# Patient Record
Sex: FEMALE | Race: WHITE | Employment: OTHER | URBAN - METROPOLITAN AREA
[De-identification: names, ages, dates, MRNs, and addresses within clinical notes are randomized per-mention and may not be internally consistent; named-entity substitution may affect disease eponyms.]

---

## 2017-01-31 ENCOUNTER — TELEPHONE (OUTPATIENT)
Dept: FAMILY MEDICINE CLINIC | Facility: CLINIC | Age: 67
End: 2017-01-31

## 2017-01-31 NOTE — TELEPHONE ENCOUNTER
[facility] patient scheduled:     Future Appointments  Date Time Provider Shanelle Gallo   2/17/2017 10:15 AM MERLYN Rodríguez [facility]   3/31/2017 9:40 AM GREGORY Aaron CARD DG        Chart has been reviewed by nurse.

## 2017-02-17 ENCOUNTER — OFFICE VISIT (OUTPATIENT)
Dept: INTERNAL MEDICINE CLINIC | Facility: CLINIC | Age: 67
End: 2017-02-17

## 2017-02-17 VITALS
DIASTOLIC BLOOD PRESSURE: 78 MMHG | WEIGHT: 183 LBS | RESPIRATION RATE: 16 BRPM | SYSTOLIC BLOOD PRESSURE: 138 MMHG | BODY MASS INDEX: 32.02 KG/M2 | HEIGHT: 63.5 IN | HEART RATE: 64 BPM

## 2017-02-17 DIAGNOSIS — E66.9 OBESITY (BMI 30.0-34.9): Primary | ICD-10-CM

## 2017-02-17 DIAGNOSIS — Z51.81 ENCOUNTER FOR THERAPEUTIC DRUG MONITORING: ICD-10-CM

## 2017-02-17 DIAGNOSIS — Z98.84 S/P GASTRIC BYPASS: ICD-10-CM

## 2017-02-17 PROCEDURE — 99203 OFFICE O/P NEW LOW 30 MIN: CPT | Performed by: NURSE PRACTITIONER

## 2017-02-17 RX ORDER — LACTOBACIL 2/BIFIDO 1/S.THERMO 450B CELL
1 PACKET (EA) ORAL DAILY
Qty: 60 CAPSULE | Refills: 2 | Status: SHIPPED | COMMUNITY
Start: 2017-02-17 | End: 2017-02-17

## 2017-02-17 RX ORDER — TOPIRAMATE 25 MG/1
25 TABLET ORAL 2 TIMES DAILY
Qty: 60 TABLET | Refills: 1 | Status: SHIPPED | OUTPATIENT
Start: 2017-02-17 | End: 2017-03-24

## 2017-02-17 RX ORDER — LACTOBACIL 2/BIFIDO 1/S.THERMO 450B CELL
1 PACKET (EA) ORAL
Qty: 1 EACH | Refills: 1 | Status: SHIPPED | OUTPATIENT
Start: 2017-02-17 | End: 2017-03-13

## 2017-02-17 NOTE — PROGRESS NOTES
CC: Patient presents with:  Weight Check: bariatric surgery 13 years ago lost 120 pounds gained 25 back. unknown loss meds. revision last summer. no exercise       HPI:    Obesity.  Weight had been up and down prior ro gastri bypass with weight loss medicat differently: as needed.  QD to AA x 2-3 weeks ) Disp: 60 g Rfl: 0   CARAFATE 1 GM/10ML Oral Suspension TAKE 2 TEASPOONSFUL BY MOUTH BEFORE MEALS AND AT BEDTIME (Patient taking differently: TAKE 2 TEASPOONSFUL BY MOUTH BEFORE MEALS AND AT BEDTIME, as needed) Comment ovaries intact, hys due to bleeding cyst/no malignancy    OTHER SURGICAL HISTORY  2003    Comment gastric bypass     EXCISE BREAST LESION  2/26/2013    Comment Procedure: BIOPSY / LUMPECTOMY BREAST;  Surgeon: Hermann Montano MD;  Location: [facility] are clear  NECK: supple,no adenopathy,no bruits  LUNGS: clear to auscultation bilaterally   CARDIO: RRR, no murmur  GI: +BS's, NT/ND no masses, no HSM   MUSCULOSKELETAL: back is not tender,FROM of the back  EXTREMITIES: no cyanosis, no clubbing, no edema start monitoring diet intake by using [employer] as has scheduled appointment with dietician. Discussed follow-up, answered patient's questions and addressed concerns. Return in about 4 weeks (around 3/17/2017).

## 2017-02-17 NOTE — PATIENT INSTRUCTIONS
Please try to work on the following dietary changes this first month  1. Drink lots of water and cut down on soda consumption if soda drinker  2.  Eat breakfast daily and focus on protein such as greek yogurt with fruit, cottage cheese, string cheese, hard · Fiber is found in foods such as vegetables, fruits, beans, and whole grains. Unlike other carbs, fiber isn’t digested or absorbed. So it doesn’t raise blood sugar.  In fact, fiber can help keep blood sugar from rising too fast. It also helps keep blood ch Protein helps the body build and repair muscle and other tissue. Protein has little or no effect on blood sugar. However, many foods that contain protein also contain saturated fat.  By choosing low-fat protein sources, you can get the benefits of protein w Habits don’t change overnight. Setting your goals too high can leave you feeling discouraged if you can’t reach them. Be realistic. Choose one or two small changes you can make now. Set an action plan for how you are going to make these changes.  When you c - kidney beans   - lentils   - brown rice   - barley   - amaranth   - quinoa   - whole grain bread   - whole grain pasta

## 2017-02-20 PROCEDURE — 82397 CHEMILUMINESCENT ASSAY: CPT | Performed by: NURSE PRACTITIONER

## 2017-02-20 PROCEDURE — 82607 VITAMIN B-12: CPT | Performed by: NURSE PRACTITIONER

## 2017-02-23 ENCOUNTER — TELEPHONE (OUTPATIENT)
Dept: INTERNAL MEDICINE CLINIC | Facility: CLINIC | Age: 67
End: 2017-02-23

## 2017-02-23 DIAGNOSIS — E66.9 OBESITY (BMI 30.0-34.9): Primary | ICD-10-CM

## 2017-02-23 DIAGNOSIS — Z51.81 ENCOUNTER FOR THERAPEUTIC DRUG MONITORING: ICD-10-CM

## 2017-02-23 DIAGNOSIS — Z98.84 S/P GASTRIC BYPASS: ICD-10-CM

## 2017-02-23 NOTE — TELEPHONE ENCOUNTER
Patient given VSL 3 packets given are higher dose of 900 billion units. The samples were 450 billion units. I advised you ordered to take one of the 900 billion units, patient thinks that you want her to take 1/2 dose?   Also the topamax 25mg bid is courtney

## 2017-02-27 NOTE — TELEPHONE ENCOUNTER
I am happy to give her instructions. I did type her questions. Please advise so I can call. Thank you!

## 2017-02-27 NOTE — TELEPHONE ENCOUNTER
Please call patient and tell her very difficult for me to call back,  Tell he to try entire packet (900)  of VSLpacket  and see how she feels if upset stomach or feeling bloated have her take 1/2 packet.   Regarding topamax instruct her to just take 1 table

## 2017-02-27 NOTE — TELEPHONE ENCOUNTER
Patient informed. She is taking one whole packet of VSL, she will try to take topamax in am only. She will call back with any complaints prior to her office visit.     Future Appointments  Date Time Provider Shanelle Gallo   3/6/2017 10:00 AM Everardo Flores

## 2017-02-27 NOTE — TELEPHONE ENCOUNTER
Evelyn I answered all her questions in my note and inform her even though she had lab tests drawn leptin did not result until today. So I will send separate note to you regarding results.

## 2017-03-06 ENCOUNTER — OFFICE VISIT (OUTPATIENT)
Dept: INTERNAL MEDICINE CLINIC | Facility: CLINIC | Age: 67
End: 2017-03-06

## 2017-03-06 VITALS — WEIGHT: 178.63 LBS | BODY MASS INDEX: 31 KG/M2

## 2017-03-06 DIAGNOSIS — E66.9 OBESITY (BMI 30.0-34.9): ICD-10-CM

## 2017-03-06 PROCEDURE — 97802 MEDICAL NUTRITION INDIV IN: CPT | Performed by: DIETITIAN, REGISTERED

## 2017-03-06 NOTE — PROGRESS NOTES
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   Helps with care of 81 yo mother and g BEDTIME, as needed) Disp: 420 mL Rfl: 1   VITAMIN D OR None Entered Disp:  Rfl:    ASPIRIN 81 MG OR TABS 1 TABLET DAILY Disp:  Rfl:    ZYRTEC-D 5-120 MG OR TB12 1 po daily prn Disp:  Rfl:    VITAMIN E 1 po daily Disp:  Rfl:    CALCIUM + D OR 1 po daily Dis pounds with 25 pounds regain.   Revision November 2015 with lost and regain of 20 pounds    Current Diet: General, avoids pasta, bread and rice    Food/Beverage Intake: Based on oral recall  Breakfast: Scrambled eggs with cottage cheese and berries or an or consultation    Monitoring/Evaluation:  Follow up appt scheduled with dietitian 4/14/17           Mag Malhotra MS, RD, LDN

## 2017-03-13 RX ORDER — LACTOBACIL 2/BIFIDO 1/S.THERMO 450B CELL
1 PACKET (EA) ORAL DAILY
Qty: 1 EACH | Refills: 0 | Status: SHIPPED | OUTPATIENT
Start: 2017-03-13 | End: 2017-03-24

## 2017-03-13 NOTE — TELEPHONE ENCOUNTER
Patient called back today saying that no one ever got back to her about the VSL #3 packets. I informed patient that one of the nurses did talk with her.   I re-iterated what Sima Everett said \" Tell her to try entire packet (900) of VSLpacket and see how she feels

## 2017-03-14 ENCOUNTER — TELEPHONE (OUTPATIENT)
Dept: INTERNAL MEDICINE CLINIC | Facility: CLINIC | Age: 67
End: 2017-03-14

## 2017-03-14 NOTE — TELEPHONE ENCOUNTER
Name of Medication: probiotic     Dose:     How is medication prescribed:    Specific name of pharmacy and location:   [place] [address] [facility], [address] [facility], [phone], [phone]    Name of mail order co

## 2017-03-24 ENCOUNTER — OFFICE VISIT (OUTPATIENT)
Dept: INTERNAL MEDICINE CLINIC | Facility: CLINIC | Age: 67
End: 2017-03-24

## 2017-03-24 VITALS
BODY MASS INDEX: 30.97 KG/M2 | RESPIRATION RATE: 16 BRPM | SYSTOLIC BLOOD PRESSURE: 144 MMHG | WEIGHT: 177 LBS | HEIGHT: 63.5 IN | HEART RATE: 80 BPM | DIASTOLIC BLOOD PRESSURE: 84 MMHG

## 2017-03-24 DIAGNOSIS — E66.9 OBESITY (BMI 30.0-34.9): Primary | ICD-10-CM

## 2017-03-24 DIAGNOSIS — Z51.81 ENCOUNTER FOR THERAPEUTIC DRUG MONITORING: ICD-10-CM

## 2017-03-24 PROCEDURE — 99213 OFFICE O/P EST LOW 20 MIN: CPT | Performed by: NURSE PRACTITIONER

## 2017-03-24 RX ORDER — LACTOBACIL 2/BIFIDO 1/S.THERMO 450B CELL
1 PACKET (EA) ORAL EVERY MORNING
Qty: 60 CAPSULE | Refills: 0 | Status: SHIPPED | COMMUNITY
Start: 2017-03-24 | End: 2017-05-23 | Stop reason: WASHOUT

## 2017-03-24 RX ORDER — TOPIRAMATE 25 MG/1
25 TABLET ORAL 2 TIMES DAILY
Qty: 60 TABLET | Refills: 1 | Status: SHIPPED | OUTPATIENT
Start: 2017-03-24 | End: 2017-05-05 | Stop reason: ALTCHOICE

## 2017-03-24 NOTE — PATIENT INSTRUCTIONS
Stress Relief: Activities  When you're feeling stressed, some simple exercises can provide relief right away. These exercises are not the kind you need sweatpants for. You can do them almost anytime and anywhere. They will help you feel more relaxed.

## 2017-03-30 PROBLEM — M23.92 INTERNAL DERANGEMENT OF LEFT KNEE: Status: ACTIVE | Noted: 2017-03-30

## 2017-03-30 PROBLEM — G89.29 CHRONIC PAIN OF LEFT KNEE: Status: ACTIVE | Noted: 2017-03-30

## 2017-03-30 PROBLEM — M94.262 CHONDROMALACIA, LEFT KNEE: Status: ACTIVE | Noted: 2017-03-30

## 2017-03-30 PROBLEM — M25.562 CHRONIC PAIN OF LEFT KNEE: Status: ACTIVE | Noted: 2017-03-30

## 2017-04-10 ENCOUNTER — TELEPHONE (OUTPATIENT)
Dept: INTERNAL MEDICINE CLINIC | Facility: CLINIC | Age: 67
End: 2017-04-10

## 2017-05-05 ENCOUNTER — OFFICE VISIT (OUTPATIENT)
Dept: INTERNAL MEDICINE CLINIC | Facility: CLINIC | Age: 67
End: 2017-05-05

## 2017-05-05 VITALS
SYSTOLIC BLOOD PRESSURE: 138 MMHG | DIASTOLIC BLOOD PRESSURE: 86 MMHG | HEIGHT: 63.5 IN | HEART RATE: 76 BPM | BODY MASS INDEX: 30.8 KG/M2 | RESPIRATION RATE: 16 BRPM | WEIGHT: 176 LBS

## 2017-05-05 DIAGNOSIS — Z51.81 ENCOUNTER FOR THERAPEUTIC DRUG MONITORING: ICD-10-CM

## 2017-05-05 DIAGNOSIS — E66.9 OBESITY (BMI 30.0-34.9): Primary | ICD-10-CM

## 2017-05-05 PROCEDURE — 99213 OFFICE O/P EST LOW 20 MIN: CPT | Performed by: NURSE PRACTITIONER

## 2017-05-05 RX ORDER — TOPIRAMATE 25 MG/1
25 TABLET ORAL 2 TIMES DAILY
Qty: 60 TABLET | Refills: 1 | Status: CANCELLED | OUTPATIENT
Start: 2017-05-05 | End: 2017-07-04
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
## 2017-05-05 NOTE — PATIENT INSTRUCTIONS
1. Take 1 trokendi pill in morning  from samples for 2 weeks and if you are tolerating when you get refill you can try taking 2 pills in am until next appointment. 2. Try to only drink 1 glass of wine a night.        A Sample Walking Program  Experts recom © 3539-1408 [address], [address] [place]. All rights reserved. This information is not intended as a substitute for professional medical care. Always follow your healthcare professional's instructions.

## 2017-05-09 PROBLEM — M23.92 DERANGEMENT, KNEE INTERNAL, LEFT: Status: ACTIVE | Noted: 2017-05-09

## 2017-05-19 ENCOUNTER — HOSPITAL (OUTPATIENT)
Dept: OTHER | Age: 67
End: 2017-05-19
Attending: ORTHOPAEDIC SURGERY

## 2017-05-19 ENCOUNTER — CHARTING TRANS (OUTPATIENT)
Dept: OTHER | Age: 67
End: 2017-05-19

## 2017-05-22 PROBLEM — S83.242D TEAR OF MEDIAL MENISCUS OF LEFT KNEE, UNSPECIFIED TEAR TYPE, UNSPECIFIED WHETHER OLD OR CURRENT TEAR, SUBSEQUENT ENCOUNTER: Status: ACTIVE | Noted: 2017-05-22

## 2017-06-06 DIAGNOSIS — Z51.81 ENCOUNTER FOR THERAPEUTIC DRUG MONITORING: ICD-10-CM

## 2017-06-06 DIAGNOSIS — E66.9 OBESITY (BMI 30.0-34.9): Primary | ICD-10-CM

## 2017-06-06 NOTE — TELEPHONE ENCOUNTER
Patient cancelled appt with Ezra Dubois for Friday 6/9 and states she will be out of medication and is requesting if there is anyway it can be refilled as her next ov is 7/14.  Please advise

## 2017-06-12 ENCOUNTER — TELEPHONE (OUTPATIENT)
Dept: INTERNAL MEDICINE CLINIC | Facility: CLINIC | Age: 67
End: 2017-06-12

## 2017-06-12 NOTE — TELEPHONE ENCOUNTER
Future Appointments  Date Time Provider Shanelle Gallo   6/13/2017 10:00 AM MERLYN JarrellWESISSY EMG [facility]   7/3/2017 10:00 AM Veanncio Gonzales MD [facility]THO [facility] DG   7/14/2017 11:00 AM MERLYN Jarrell EMGWESISSY EMG [facility] 75[facility]   8/11/2017

## 2017-06-13 ENCOUNTER — OFFICE VISIT (OUTPATIENT)
Dept: INTERNAL MEDICINE CLINIC | Facility: CLINIC | Age: 67
End: 2017-06-13

## 2017-06-13 VITALS
HEART RATE: 76 BPM | DIASTOLIC BLOOD PRESSURE: 86 MMHG | SYSTOLIC BLOOD PRESSURE: 128 MMHG | HEIGHT: 63.5 IN | RESPIRATION RATE: 16 BRPM | BODY MASS INDEX: 29.57 KG/M2 | WEIGHT: 169 LBS

## 2017-06-13 DIAGNOSIS — E66.9 OBESITY (BMI 30.0-34.9): Primary | ICD-10-CM

## 2017-06-13 DIAGNOSIS — Z51.81 ENCOUNTER FOR THERAPEUTIC DRUG MONITORING: ICD-10-CM

## 2017-06-13 PROCEDURE — 99213 OFFICE O/P EST LOW 20 MIN: CPT | Performed by: NURSE PRACTITIONER
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
## 2017-06-20 PROBLEM — M23.91 INTERNAL DERANGEMENT OF KNEE, RIGHT: Status: ACTIVE | Noted: 2017-06-20

## 2017-06-22 NOTE — TELEPHONE ENCOUNTER
Requesting VSL   LOV: 6/16/17  RTC: 4 weeks   Last Labs: n/a   Filled: 3/24/17 #60 with 0 refills    Future Appointments  Date Time Provider Shanelle Gallo   7/14/2017 11:00 AM MERLYN Juarez EMG [facility]   8/11/2017 1:20 PM Kingsley Contreras

## 2017-06-23 RX ORDER — LACTOBACIL 2/BIFIDO 1/S.THERMO 450B CELL
PACKET (EA) ORAL
Qty: 90 CAPSULE | Refills: 1 | Status: SHIPPED | OUTPATIENT
Start: 2017-06-23 | End: 2018-01-11

## 2017-07-10 PROBLEM — Z98.890 S/P RIGHT KNEE ARTHROSCOPY: Status: ACTIVE | Noted: 2017-07-10

## 2017-07-14 ENCOUNTER — OFFICE VISIT (OUTPATIENT)
Dept: INTERNAL MEDICINE CLINIC | Facility: CLINIC | Age: 67
End: 2017-07-14

## 2017-07-14 VITALS
WEIGHT: 168 LBS | RESPIRATION RATE: 16 BRPM | SYSTOLIC BLOOD PRESSURE: 128 MMHG | HEART RATE: 80 BPM | BODY MASS INDEX: 29.4 KG/M2 | DIASTOLIC BLOOD PRESSURE: 76 MMHG | HEIGHT: 63.5 IN

## 2017-07-14 DIAGNOSIS — Z51.81 ENCOUNTER FOR THERAPEUTIC DRUG MONITORING: Primary | ICD-10-CM

## 2017-07-14 DIAGNOSIS — E66.3 OVERWEIGHT (BMI 25.0-29.9): ICD-10-CM

## 2017-07-14 PROCEDURE — 99213 OFFICE O/P EST LOW 20 MIN: CPT | Performed by: NURSE PRACTITIONER
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
## 2017-07-14 NOTE — PATIENT INSTRUCTIONS
Get back to increased exercise when cleared for knee. Weight Management: Overcoming Your Barriers  You may have many reasons why you’re not ready to lose weight. You may not feel you have the time or the skills.  You may be afraid of losing weight and Do you have a health problem? If so, don’t use it as an excuse for not losing weight. Ask your healthcare provider or dietitian about methods to lose weight that are safe for you.  For example, even if you have severe arthritis, it may be easier for you to

## 2017-07-17 PROBLEM — S83.241D TEAR OF MEDIAL MENISCUS OF RIGHT KNEE, UNSPECIFIED TEAR TYPE, UNSPECIFIED WHETHER OLD OR CURRENT TEAR, SUBSEQUENT ENCOUNTER: Status: ACTIVE | Noted: 2017-07-17

## 2017-09-08 ENCOUNTER — OFFICE VISIT (OUTPATIENT)
Dept: INTERNAL MEDICINE CLINIC | Facility: CLINIC | Age: 67
End: 2017-09-08

## 2017-09-08 VITALS
RESPIRATION RATE: 16 BRPM | HEART RATE: 76 BPM | BODY MASS INDEX: 29.57 KG/M2 | SYSTOLIC BLOOD PRESSURE: 120 MMHG | WEIGHT: 169 LBS | HEIGHT: 63.5 IN | DIASTOLIC BLOOD PRESSURE: 82 MMHG

## 2017-09-08 DIAGNOSIS — Z51.81 ENCOUNTER FOR THERAPEUTIC DRUG MONITORING: ICD-10-CM

## 2017-09-08 DIAGNOSIS — E66.9 OBESITY (BMI 30.0-34.9): ICD-10-CM

## 2017-09-08 PROCEDURE — 99213 OFFICE O/P EST LOW 20 MIN: CPT | Performed by: NURSE PRACTITIONER

## 2017-09-08 RX ORDER — BUPROPION HYDROCHLORIDE 150 MG/1
150 TABLET, EXTENDED RELEASE ORAL 2 TIMES DAILY
Qty: 60 TABLET | Refills: 0 | Status: CANCELLED | COMMUNITY
Start: 2017-09-08 | End: 2017-10-08

## 2017-09-08 RX ORDER — BUPROPION HYDROCHLORIDE 150 MG/1
150 TABLET, EXTENDED RELEASE ORAL 2 TIMES DAILY
Qty: 60 TABLET | Refills: 1 | Status: SHIPPED | OUTPATIENT
Start: 2017-09-08 | End: 2017-11-01

## 2017-09-08 NOTE — PROGRESS NOTES
CC: Patient presents with:  Weight Check: one pound weight gain       HPI:   Obesity. Patient doing well on increased  trokendi feels it has really cut down carb cravings.  She missed a month due to family visiting and she has had to entertain a lot o state, unspecified     stress echo normal 4/2012   • CAD (coronary artery disease) 11/2016    mild CAD on [facility] done- abn stress test   • Cervicalgia 7/12/2013   • Dermatitis 10/10/2014   • Esophageal reflux    • Fracture of distal phalanx of left thumb 10/1 2      Sig: Take 50 mg by mouth every morning. BuPROPion HCl ER, SR, (WELLBUTRIN SR) 150 MG Oral Tablet 12 Hr 60 tablet 1      Sig: Take 1 tablet (150 mg total) by mouth 2 (two) times daily.           None     ASSESSMENT:   Encounter for therapeutic

## 2017-09-08 NOTE — PATIENT INSTRUCTIONS
A Sample Walking Program  Experts recommend walking briskly on most days. Aim for a target of 30 minutes on most days, or 150 or more minutes a week.  Walking programs can help you reach this goal by slowly increasing the frequency and the amount of  time

## 2017-09-21 PROBLEM — R92.2 DENSE BREAST TISSUE: Status: ACTIVE | Noted: 2017-09-21

## 2017-09-21 PROBLEM — Z90.710 ACQUIRED ABSENCE OF BOTH CERVIX AND UTERUS: Status: ACTIVE | Noted: 2017-09-21

## 2017-09-21 PROBLEM — Z79.890 HORMONE REPLACEMENT THERAPY (HRT): Status: ACTIVE | Noted: 2017-09-21

## 2017-10-08 PROBLEM — I25.811 CORONARY ARTERY DISEASE INVOLVING NATIVE ARTERY OF TRANSPLANTED HEART WITHOUT ANGINA PECTORIS: Status: RESOLVED | Noted: 2017-10-08 | Resolved: 2017-10-08

## 2017-10-08 PROBLEM — I25.10 CORONARY ARTERY DISEASE INVOLVING NATIVE CORONARY ARTERY OF NATIVE HEART WITHOUT ANGINA PECTORIS: Status: ACTIVE | Noted: 2017-10-08

## 2017-10-08 PROBLEM — K86.89 PANCREATIC MASS: Status: RESOLVED | Noted: 2017-10-08 | Resolved: 2017-10-08

## 2017-10-08 PROBLEM — I25.811 CORONARY ARTERY DISEASE INVOLVING NATIVE ARTERY OF TRANSPLANTED HEART WITHOUT ANGINA PECTORIS: Status: ACTIVE | Noted: 2017-10-08

## 2017-10-08 PROBLEM — K86.89 PANCREATIC MASS: Status: ACTIVE | Noted: 2017-10-08

## 2017-11-01 DIAGNOSIS — E66.9 OBESITY (BMI 30.0-34.9): ICD-10-CM

## 2017-11-01 DIAGNOSIS — Z51.81 ENCOUNTER FOR THERAPEUTIC DRUG MONITORING: ICD-10-CM

## 2017-11-01 NOTE — TELEPHONE ENCOUNTER
Requesting: bupropion ER,SR 150mg 1 BID  LOV: 9/8/17  Denies chest pain will continue trokendi and add wellbutrin   RTC: 4 weeks  Last Labs: 2/20/17  Filled: 9/8/17 #60 with 1 refills    Future Appointments  Date Time Provider Shanelle Gallo   11/10/201

## 2017-11-05 RX ORDER — BUPROPION HYDROCHLORIDE 150 MG/1
150 TABLET, EXTENDED RELEASE ORAL 2 TIMES DAILY
Qty: 60 TABLET | Refills: 0 | Status: SHIPPED | OUTPATIENT
Start: 2017-11-05 | End: 2017-11-10

## 2017-11-10 ENCOUNTER — OFFICE VISIT (OUTPATIENT)
Dept: INTERNAL MEDICINE CLINIC | Facility: CLINIC | Age: 67
End: 2017-11-10

## 2017-11-10 VITALS
RESPIRATION RATE: 16 BRPM | WEIGHT: 171 LBS | SYSTOLIC BLOOD PRESSURE: 120 MMHG | BODY MASS INDEX: 29.92 KG/M2 | DIASTOLIC BLOOD PRESSURE: 74 MMHG | HEART RATE: 80 BPM | HEIGHT: 63.5 IN

## 2017-11-10 DIAGNOSIS — Z51.81 ENCOUNTER FOR THERAPEUTIC DRUG MONITORING: ICD-10-CM

## 2017-11-10 DIAGNOSIS — E66.9 OBESITY (BMI 30.0-34.9): ICD-10-CM

## 2017-11-10 PROCEDURE — 99213 OFFICE O/P EST LOW 20 MIN: CPT | Performed by: NURSE PRACTITIONER

## 2017-11-10 RX ORDER — BUPROPION HYDROCHLORIDE 150 MG/1
150 TABLET, EXTENDED RELEASE ORAL 2 TIMES DAILY
Qty: 60 TABLET | Refills: 0 | Status: CANCELLED | OUTPATIENT
Start: 2017-11-10 | End: 2017-12-10

## 2017-11-10 NOTE — PATIENT INSTRUCTIONS
Diabetes: Learning About Serving and Portion Sizes     A good rule of thumb: Devote half your plate to vegetables and green salad. Split the other half between protein and starchy carbohydrates. Fruit makes a good dessert. Servings and portions.  What When you’re planning for a snack or a meal, keep servings in mind. If you don’t have measuring cups or a scale handy, there are ways to [place] serving sizes, such as comparing your food to the size of your hand (see pictures above).   Managing portion si

## 2017-11-10 NOTE — PROGRESS NOTES
CC: Patient presents with:  Weight Check: 2 pound weight gain       HPI:   Obesity. Patient doing well on trokendi but has been on wellbutrin and feels she is more emotional and not feeling like herself.  She was making not as healthy choices this mon pectoris 10/8/2017   • Coronary artery disease involving native coronary artery of native heart without angina pectoris 10/8/2017   • Dermatitis 10/10/2014   • Esophageal reflux    • Fracture of distal phalanx of left thumb 10/15/2014   • Gastric bypass st is clear, PERRLA  LUNGS: CTA bilat   CARDIO: RRR without murmur  GI: +BS's    No orders of the defined types were placed in this encounter.        Signed Prescriptions Disp Refills    Topiramate ER (TROKENDI XR) 50 MG Oral Capsule SR 24 Hr 30 capsule 2

## 2017-12-08 ENCOUNTER — OFFICE VISIT (OUTPATIENT)
Dept: INTERNAL MEDICINE CLINIC | Facility: CLINIC | Age: 67
End: 2017-12-08

## 2017-12-08 VITALS
DIASTOLIC BLOOD PRESSURE: 62 MMHG | WEIGHT: 172 LBS | SYSTOLIC BLOOD PRESSURE: 100 MMHG | RESPIRATION RATE: 16 BRPM | HEIGHT: 63.5 IN | HEART RATE: 70 BPM | BODY MASS INDEX: 30.1 KG/M2

## 2017-12-08 DIAGNOSIS — E66.9 OBESITY (BMI 30.0-34.9): ICD-10-CM

## 2017-12-08 DIAGNOSIS — Z51.81 ENCOUNTER FOR THERAPEUTIC DRUG MONITORING: ICD-10-CM

## 2017-12-08 PROCEDURE — 99213 OFFICE O/P EST LOW 20 MIN: CPT | Performed by: NURSE PRACTITIONER

## 2017-12-08 RX ORDER — BUPROPION HYDROCHLORIDE 150 MG/1
150 TABLET, EXTENDED RELEASE ORAL 2 TIMES DAILY
Qty: 60 TABLET | Refills: 1 | Status: CANCELLED | OUTPATIENT
Start: 2017-12-08 | End: 2018-02-06

## 2017-12-08 NOTE — PROGRESS NOTES
CC: Patient presents with:  Weight Check: up 1       HPI:   Obesity. Patient doing well on trokendi but has been on wellbutrin and feels she is more emotional and not feeling like herself.  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

## 2017-12-08 NOTE — PATIENT INSTRUCTIONS
1. Take 0.6 for 1 week if tolerating no nausea  2. Increase to 1.2 for 1 week if tolerating  3. Increase 1.8 until seen next month if doing absolutely fine you can try 2.4      A Sample Walking Program  Experts recommend walking briskly on most days.  Aim f © 8899-9894 [address]. [address] [facility] [place] [facility] [place]. All rights reserved. This information is not intended as a substitute for professional medical care. Always follow your healthcare professional's instructions.

## 2017-12-09 NOTE — PROGRESS NOTES
CC: Patient presents with:  Weight Check: up 1       HPI:   Obesity. Patient doing well on trokendi and metformin. Frustrated as not losing weight as feels hungry and with stress eats not as healthy.  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

## 2017-12-27 ENCOUNTER — TELEPHONE (OUTPATIENT)
Dept: INTERNAL MEDICINE CLINIC | Facility: CLINIC | Age: 67
End: 2017-12-27

## 2017-12-27 RX ORDER — LACTOBACIL 2/BIFIDO 1/S.THERMO 450B CELL
PACKET (EA) ORAL
Qty: 60 CAPSULE | Refills: 0 | OUTPATIENT
Start: 2017-12-27

## 2017-12-27 NOTE — TELEPHONE ENCOUNTER
Patient filled script for Fanny Markham, and picked it up. She got home to realize that she needs needles for the Pen. Please call in a script for the needles to the pharmacy listed below.             Name of Medication:  Liraglutide -Weight Management ([place])

## 2018-01-13 RX ORDER — LACTOBACIL 2/BIFIDO 1/S.THERMO 450B CELL
PACKET (EA) ORAL
Qty: 60 CAPSULE | Refills: 2 | Status: SHIPPED | OUTPATIENT
Start: 2018-01-13 | End: 2018-02-27

## 2018-01-19 ENCOUNTER — OFFICE VISIT (OUTPATIENT)
Dept: INTERNAL MEDICINE CLINIC | Facility: CLINIC | Age: 68
End: 2018-01-19

## 2018-01-19 VITALS
WEIGHT: 167 LBS | SYSTOLIC BLOOD PRESSURE: 116 MMHG | HEART RATE: 76 BPM | BODY MASS INDEX: 29.22 KG/M2 | RESPIRATION RATE: 16 BRPM | HEIGHT: 63.5 IN | DIASTOLIC BLOOD PRESSURE: 76 MMHG

## 2018-01-19 DIAGNOSIS — Z51.81 ENCOUNTER FOR THERAPEUTIC DRUG MONITORING: ICD-10-CM

## 2018-01-19 DIAGNOSIS — E66.9 OBESITY (BMI 30.0-34.9): ICD-10-CM

## 2018-01-19 PROCEDURE — 99213 OFFICE O/P EST LOW 20 MIN: CPT | Performed by: NURSE PRACTITIONER

## 2018-01-19 RX ORDER — LACTOBACIL 2/BIFIDO 1/S.THERMO 450B CELL
1 PACKET (EA) ORAL EVERY MORNING
Qty: 60 CAPSULE | Refills: 1 | Status: SHIPPED | OUTPATIENT
Start: 2018-01-19 | End: 2018-08-13

## 2018-01-19 NOTE — PATIENT INSTRUCTIONS
Increase saxenda to 2.4 a day for at least a week and if tolerating increase to 3.0 mg      Weight Management: Exercise and Activity    Studies show that people who exercise are the most likely to lose weight and keep it off. Exercise burns calories.  It he © 9607-4048 [address]. [address] [facility], [facility] [place]. All rights reserved. This information is not intended as a substitute for professional medical care. Always follow your healthcare professional's instructions.

## 2018-01-19 NOTE — PROGRESS NOTES
CC: Patient presents with:  Weight Check: lsot 5 pounds. injecting 1.8 saxenda       HPI:   Obesity. Patient doing well saxenda and trokendi. She feels saxenda has curbed her appetite and feels full so is eating smaller portions.  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

## 2018-02-27 PROCEDURE — 84425 ASSAY OF VITAMIN B-1: CPT | Performed by: INTERNAL MEDICINE

## 2018-02-27 PROCEDURE — 84630 ASSAY OF ZINC: CPT | Performed by: INTERNAL MEDICINE

## 2018-02-27 PROCEDURE — 82746 ASSAY OF FOLIC ACID SERUM: CPT | Performed by: INTERNAL MEDICINE

## 2018-02-27 PROCEDURE — 82607 VITAMIN B-12: CPT | Performed by: INTERNAL MEDICINE

## 2018-03-25 PROBLEM — I51.89 LEFT VENTRICULAR DIASTOLIC DYSFUNCTION WITH PRESERVED SYSTOLIC FUNCTION: Status: ACTIVE | Noted: 2018-03-25

## 2018-03-25 PROBLEM — I70.0 AORTIC ATHEROSCLEROSIS (HCC): Status: ACTIVE | Noted: 2018-03-25

## 2018-08-13 RX ORDER — LACTOBACIL 2/BIFIDO 1/S.THERMO 450B CELL
PACKET (EA) ORAL
Qty: 60 CAPSULE | OUTPATIENT
Start: 2018-08-13

## 2018-08-13 NOTE — TELEPHONE ENCOUNTER
Pending Prescriptions Disp Refills    VSL#3 Oral Cap [Pharmacy Med Name: VSL#3 Oral Capsule] 60 capsule      Sig: TAKE ONE CAPSULE EVERY MORNING         Pt no longer seen with EMG    See TE 8/13/18
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
## 2018-09-26 PROCEDURE — 86803 HEPATITIS C AB TEST: CPT | Performed by: INTERNAL MEDICINE

## 2019-02-06 PROCEDURE — 84425 ASSAY OF VITAMIN B-1: CPT | Performed by: INTERNAL MEDICINE

## 2019-02-06 PROCEDURE — 84630 ASSAY OF ZINC: CPT | Performed by: INTERNAL MEDICINE

## 2019-08-21 PROBLEM — G57.61 MORTON METATARSALGIA, RIGHT: Status: ACTIVE | Noted: 2019-08-21

## 2019-09-16 PROCEDURE — 84425 ASSAY OF VITAMIN B-1: CPT | Performed by: INTERNAL MEDICINE

## 2019-09-16 PROCEDURE — 84630 ASSAY OF ZINC: CPT | Performed by: INTERNAL MEDICINE

## 2019-10-09 PROCEDURE — 82397 CHEMILUMINESCENT ASSAY: CPT | Performed by: INTERNAL MEDICINE

## 2019-10-14 PROCEDURE — 88305 TISSUE EXAM BY PATHOLOGIST: CPT | Performed by: INTERNAL MEDICINE

## 2020-03-05 ENCOUNTER — HOSPITAL ENCOUNTER (OUTPATIENT)
Dept: CT IMAGING | Facility: HOSPITAL | Age: 70
Discharge: HOME | End: 2020-03-05
Attending: INTERNAL MEDICINE
Payer: MEDICARE

## 2020-03-05 VITALS — DIASTOLIC BLOOD PRESSURE: 62 MMHG | SYSTOLIC BLOOD PRESSURE: 122 MMHG | RESPIRATION RATE: 16 BRPM | HEART RATE: 62 BPM

## 2020-03-05 DIAGNOSIS — R07.9 CHEST PAIN, UNSPECIFIED TYPE: ICD-10-CM

## 2020-03-05 DIAGNOSIS — R07.9 CHEST PAIN, UNSPECIFIED: ICD-10-CM

## 2020-03-05 LAB — CREAT BLD-MCNC: 0.8 MG/DL (ref 0.55–1.02)

## 2020-03-05 PROCEDURE — 82565 ASSAY OF CREATININE: CPT

## 2020-03-05 PROCEDURE — 75574 CT ANGIO HRT W/3D IMAGE: CPT | Performed by: INTERNAL MEDICINE

## 2020-03-05 RX ORDER — METOPROLOL TARTRATE 5 MG/5ML
INJECTION INTRAVENOUS
Status: COMPLETED
Start: 2020-03-05 | End: 2020-03-05

## 2020-03-05 RX ORDER — NITROGLYCERIN 0.4 MG/1
TABLET SUBLINGUAL
Status: COMPLETED
Start: 2020-03-05 | End: 2020-03-05

## 2020-03-05 RX ADMIN — NITROGLYCERIN 0.4 MG: 0.4 TABLET SUBLINGUAL at 10:10:00

## 2020-03-05 RX ADMIN — METOPROLOL TARTRATE 5 MG: 5 INJECTION INTRAVENOUS at 10:00:00

## 2020-03-05 RX ADMIN — METOPROLOL TARTRATE 5 MG: 5 INJECTION INTRAVENOUS at 09:55:00

## 2020-03-05 NOTE — IMAGING NOTE
Patient arrived to CT for a CTA of  HER CORONARY  ARTERIES  for  Complaints of Chest Pain. HER affect is good and conversational and  HER baseline HR is 72. SHE followed the pre procedure orders by taking beta blockers last night and this morning.   Tech

## 2020-07-16 PROBLEM — M23.91 INTERNAL DERANGEMENT OF KNEE, RIGHT: Status: RESOLVED | Noted: 2017-06-20 | Resolved: 2020-07-16

## 2020-07-16 PROBLEM — S83.241D TEAR OF MEDIAL MENISCUS OF RIGHT KNEE, UNSPECIFIED TEAR TYPE, UNSPECIFIED WHETHER OLD OR CURRENT TEAR, SUBSEQUENT ENCOUNTER: Status: RESOLVED | Noted: 2017-07-17 | Resolved: 2020-07-16

## 2020-07-16 PROBLEM — S83.242D TEAR OF MEDIAL MENISCUS OF LEFT KNEE, UNSPECIFIED TEAR TYPE, UNSPECIFIED WHETHER OLD OR CURRENT TEAR, SUBSEQUENT ENCOUNTER: Status: RESOLVED | Noted: 2017-05-22 | Resolved: 2020-07-16

## 2020-08-19 PROBLEM — M24.574 CONTRACTURE OF JOINT OF RIGHT FOOT: Status: ACTIVE | Noted: 2020-08-19

## 2020-08-19 PROBLEM — M20.41 HAMMER TOE OF SECOND TOE OF RIGHT FOOT: Status: ACTIVE | Noted: 2020-08-19

## 2021-01-11 PROCEDURE — 88305 TISSUE EXAM BY PATHOLOGIST: CPT | Performed by: ORTHOPAEDIC SURGERY

## 2021-12-15 PROBLEM — Z79.890 HORMONE REPLACEMENT THERAPY (HRT): Status: RESOLVED | Noted: 2017-09-21 | Resolved: 2021-12-15

## 2024-04-29 ENCOUNTER — HOSPITAL ENCOUNTER (EMERGENCY)
Age: 74
Discharge: HOME | End: 2024-04-29
Attending: STUDENT IN AN ORGANIZED HEALTH CARE EDUCATION/TRAINING PROGRAM

## 2024-04-29 ENCOUNTER — APPOINTMENT (OUTPATIENT)
Dept: GENERAL RADIOLOGY | Age: 74
End: 2024-04-29
Attending: STUDENT IN AN ORGANIZED HEALTH CARE EDUCATION/TRAINING PROGRAM

## 2024-04-29 VITALS
SYSTOLIC BLOOD PRESSURE: 160 MMHG | RESPIRATION RATE: 16 BRPM | DIASTOLIC BLOOD PRESSURE: 70 MMHG | OXYGEN SATURATION: 96 % | HEIGHT: 64 IN | HEART RATE: 67 BPM | WEIGHT: 165.1 LBS | BODY MASS INDEX: 28.19 KG/M2 | TEMPERATURE: 98.2 F

## 2024-04-29 DIAGNOSIS — R01.1 HEART MURMUR: ICD-10-CM

## 2024-04-29 DIAGNOSIS — R55 NEAR SYNCOPE: Primary | ICD-10-CM

## 2024-04-29 DIAGNOSIS — E86.0 DEHYDRATION: ICD-10-CM

## 2024-04-29 LAB
ALBUMIN SERPL-MCNC: 3.4 G/DL (ref 3.6–5.1)
ALBUMIN/GLOB SERPL: 0.7 {RATIO} (ref 1–2.4)
ALP SERPL-CCNC: 96 UNITS/L (ref 45–117)
ALT SERPL-CCNC: 28 UNITS/L
ANION GAP SERPL CALC-SCNC: 8 MMOL/L (ref 7–19)
AST SERPL-CCNC: 22 UNITS/L
BASOPHILS # BLD: 0.1 K/MCL (ref 0–0.3)
BASOPHILS NFR BLD: 1 %
BILIRUB SERPL-MCNC: 0.3 MG/DL (ref 0.2–1)
BUN SERPL-MCNC: 14 MG/DL (ref 6–20)
BUN/CREAT SERPL: 21 (ref 7–25)
CALCIUM SERPL-MCNC: 8.9 MG/DL (ref 8.4–10.2)
CHLORIDE SERPL-SCNC: 102 MMOL/L (ref 97–110)
CO2 SERPL-SCNC: 31 MMOL/L (ref 21–32)
CREAT SERPL-MCNC: 0.66 MG/DL (ref 0.51–0.95)
DEPRECATED RDW RBC: 45.7 FL (ref 39–50)
EGFRCR SERPLBLD CKD-EPI 2021: >90 ML/MIN/{1.73_M2}
EOSINOPHIL # BLD: 0.4 K/MCL (ref 0–0.5)
EOSINOPHIL NFR BLD: 4 %
ERYTHROCYTE [DISTWIDTH] IN BLOOD: 13.4 % (ref 11–15)
FASTING DURATION TIME PATIENT: ABNORMAL H
GLOBULIN SER-MCNC: 4.6 G/DL (ref 2–4)
GLUCOSE SERPL-MCNC: 100 MG/DL (ref 70–99)
HCT VFR BLD CALC: 37.6 % (ref 36–46.5)
HGB BLD-MCNC: 12 G/DL (ref 12–15.5)
IMM GRANULOCYTES # BLD AUTO: 0.1 K/MCL (ref 0–0.2)
IMM GRANULOCYTES # BLD: 1 %
LYMPHOCYTES # BLD: 2.3 K/MCL (ref 1–4)
LYMPHOCYTES NFR BLD: 24 %
MCH RBC QN AUTO: 29.5 PG (ref 26–34)
MCHC RBC AUTO-ENTMCNC: 31.9 G/DL (ref 32–36.5)
MCV RBC AUTO: 92.4 FL (ref 78–100)
MONOCYTES # BLD: 0.8 K/MCL (ref 0.3–0.9)
MONOCYTES NFR BLD: 8 %
NEUTROPHILS # BLD: 6.2 K/MCL (ref 1.8–7.7)
NEUTROPHILS NFR BLD: 62 %
NRBC BLD MANUAL-RTO: 0 /100 WBC
PLATELET # BLD AUTO: 293 K/MCL (ref 140–450)
POTASSIUM SERPL-SCNC: 3.5 MMOL/L (ref 3.4–5.1)
PROT SERPL-MCNC: 8 G/DL (ref 6.4–8.2)
RAINBOW EXTRA TUBES HOLD SPECIMEN: NORMAL
RBC # BLD: 4.07 MIL/MCL (ref 4–5.2)
SODIUM SERPL-SCNC: 137 MMOL/L (ref 135–145)
TROPONIN I SERPL DL<=0.01 NG/ML-MCNC: 6 NG/L
WBC # BLD: 9.7 K/MCL (ref 4.2–11)

## 2024-04-29 PROCEDURE — 36415 COLL VENOUS BLD VENIPUNCTURE: CPT

## 2024-04-29 PROCEDURE — 93010 ELECTROCARDIOGRAM REPORT: CPT | Performed by: SPECIALIST

## 2024-04-29 PROCEDURE — 80053 COMPREHEN METABOLIC PANEL: CPT | Performed by: STUDENT IN AN ORGANIZED HEALTH CARE EDUCATION/TRAINING PROGRAM

## 2024-04-29 PROCEDURE — 93005 ELECTROCARDIOGRAM TRACING: CPT | Performed by: STUDENT IN AN ORGANIZED HEALTH CARE EDUCATION/TRAINING PROGRAM

## 2024-04-29 PROCEDURE — 84484 ASSAY OF TROPONIN QUANT: CPT | Performed by: STUDENT IN AN ORGANIZED HEALTH CARE EDUCATION/TRAINING PROGRAM

## 2024-04-29 PROCEDURE — 71045 X-RAY EXAM CHEST 1 VIEW: CPT

## 2024-04-29 PROCEDURE — 99285 EMERGENCY DEPT VISIT HI MDM: CPT

## 2024-04-29 PROCEDURE — 85025 COMPLETE CBC W/AUTO DIFF WBC: CPT | Performed by: STUDENT IN AN ORGANIZED HEALTH CARE EDUCATION/TRAINING PROGRAM

## 2024-04-29 PROCEDURE — 10002807 HB RX 258: Performed by: STUDENT IN AN ORGANIZED HEALTH CARE EDUCATION/TRAINING PROGRAM

## 2024-04-29 RX ORDER — MECLIZINE HYDROCHLORIDE 25 MG/1
25 TABLET ORAL ONCE
Status: DISCONTINUED | OUTPATIENT
Start: 2024-04-29 | End: 2024-04-29

## 2024-04-29 RX ORDER — ONDANSETRON 2 MG/ML
4 INJECTION INTRAMUSCULAR; INTRAVENOUS ONCE
Status: DISCONTINUED | OUTPATIENT
Start: 2024-04-29 | End: 2024-04-29

## 2024-04-29 RX ADMIN — SODIUM CHLORIDE 500 ML: 0.9 INJECTION, SOLUTION INTRAVENOUS at 18:00

## 2024-04-29 RX ADMIN — SODIUM CHLORIDE 500 ML: 9 INJECTION, SOLUTION INTRAVENOUS at 14:59

## 2024-04-29 ASSESSMENT — ENCOUNTER SYMPTOMS
ABDOMINAL PAIN: 0
LIGHT-HEADEDNESS: 1
DIZZINESS: 0
CONFUSION: 0
WOUND: 0
COUGH: 0
VOICE CHANGE: 0
NAUSEA: 0
VOMITING: 0
HEADACHES: 0
CHILLS: 0
FEVER: 0
SHORTNESS OF BREATH: 0

## 2024-04-30 LAB
ATRIAL RATE (BPM): 63
P AXIS (DEGREES): 49
PR-INTERVAL (MSEC): 138
QRS-INTERVAL (MSEC): 100
QT-INTERVAL (MSEC): 432
QTC: 442
R AXIS (DEGREES): 32
REPORT TEXT: NORMAL
T AXIS (DEGREES): 52
VENTRICULAR RATE EKG/MIN (BPM): 63

## (undated) NOTE — Clinical Note
Dr. Noemi Persaud Thanks for the referral as patient appears motivated to make changes to help with weight loss with the addition of medication to help make the changes.  Barbie ZULETA

## (undated) NOTE — MR AVS SNAPSHOT
[address] [phone]               Thank you for choosing us for your health care visit with Amelie Cantu RD.   We are glad to serve you and happy to provide you with this summary of PLACE 1 PATCH ONTO SKIN WEEKLY           Clobetasol Propionate 0.05 % Crea   QD to AA x 2-3 weeks   What changed:    - when to take this  - reasons to take this  - additional instructions   Commonly known as:  TEMOVATE           Mometasone Furoate 0.1 % Cr

## (undated) NOTE — MR AVS SNAPSHOT
[address] [phone]               Thank you for choosing us for your health care visit with MERLYN Iqbal.   We are glad to serve you and happy to provide you with this summary · Walk 3 times a week. · Walk for 10 minutes each time. Third week  · Walk 3 times a week. · Walk for 13 minutes each time. Fourth week  · Walk 3 times a week. · Walk for 15 minutes each time. Fifth week  · Walk 4 times a week.   · Walk for 15 minutes TAKE 2 TEASPOONSFUL BY MOUTH BEFORE MEALS AND AT BEDTIME   What changed:  See the new instructions.            CLIMARA 0.05 MG/24HR Ptwk   Generic drug:  estradiol   PLACE 1 PATCH ONTO SKIN WEEKLY           Clobetasol Propionate 0.05 % Crea   QD to AA x 2-3 Educational Information     Your blood pressure indicates you may be at-risk for Hypertension. Please consider the following Lifestyle Modifications. Also, please return for a follow-up Blood Pressure Check in 1 year.      Lifestyle Modification Recommen Start activities slowly and build up over time Do what you like   Get your heart pumping – brisk walking, biking, swimming Even 10 minute increments are effective and add up over the week   2 ½ hours per week – spread out over time Use a bill to keep you

## (undated) NOTE — MR AVS SNAPSHOT
[address] [phone]               Thank you for choosing us for your health care visit with MERLYN Cr.   We are glad to serve you and happy to provide you with this summary minutes to think things through. Even a short walk can help you feel better. That's because walking is a positive action that you control.     Stretching  Muscle tension is a common response to stress. Stretching is a simple way to loosen up.  Try these:  · CLIMARA 0.05 MG/24HR Ptwk   Generic drug:  estradiol   PLACE 1 PATCH ONTO SKIN WEEKLY           Clobetasol Propionate 0.05 % Crea   QD to AA x 2-3 weeks   What changed:    - when to take this  - reasons to take this  - additional instructions   Commonly k For medical emergencies, dial 911. Educational Information     Your blood pressure indicates you may be at-risk for Hypertension. Please consider the following Lifestyle Modifications.   Also, please return for a follow-up Blood Pressure Check in Visit [facility] online at  [email or web address]

## (undated) NOTE — MR AVS SNAPSHOT
[address] [phone]               Thank you for choosing us for your health care visit with MERLYN Cr.   We are glad to serve you and happy to provide you with this summary and gaining it back again. Well, you can lose weight. And you can keep the weight off, if you make changes slowly and stick with them. Remember that you may never find the perfect time to lose weight. Decide that the right time to be healthier is now.     C a .    Date Last Reviewed: 2/2/2016  © 1584-7235 The [address] [facility], [address] [place] [address]. All rights reserved. This information is not intended as a substitute for professional medical care.  Always follow yo 1 po daily prn                Where to Get Your Medications      These medications were sent to [id number] [place], [address] [facility].  AT [place], [phone], [phone]  [address] [place] [address]., [address] [facility] [address]     Phone:  [phone]

## (undated) NOTE — MR AVS SNAPSHOT
[address] [phone]               Thank you for choosing us for your health care visit with MERLYN Portillo.   We are glad to serve you and happy to provide you with this summary give up desserts. Instead, your dietitian can show you how to plan meals to suit your body. To start, learn how different foods affect blood sugar. Carbohydrates  Carbohydrates are the main source of fuel for the body. Carbohydrates raise blood sugar.  Man them instead of saturated fats is healthy for your heart.  Certain unsaturated fats can help lower triglycerides.   Less Healthy:  · Saturated fats are found in animal products, such as meat, poultry, whole milk, lard, and butter. Saturated fats raise LDL c Set your long-term goal  Your goal doesn't even have to be a specific weight. You may decide on a fitness goal (such as being able to walk 10 miles a week), or a health goal (such as lowering your blood pressure).  Choose a goal that is measurable and reaso - cookies   - white crackers   - brownies   - cakes   - candy   - sugar   - brown sugar   - honey   GOOD carbs   - spinach   - kale   - tomatoes   - mushrooms   - beets   - brussels sprouts   - broccoli   - onion   - squash   - artichoke   - berries   - or - additional instructions   Commonly known as:  TEMOVATE           Mometasone Furoate 0.1 % Crea   APPLY TWO TIMES A DAY FOR 7 DAYS   Commonly known as:  ELOCON           multivitamin Tabs   1 podaily           PREVACID 30 MG Cpdr   Generic drug:  lansopra drug monitoring [Z51.81], S/P gastric bypass [Z98.84]           Leptin, Serum    Complete by:  Feb 17, 2017 (Approximate)    Assoc Dx:  Obesity (BMI 30.0-34. 9) [E66.9], Encounter for therapeutic drug monitoring [Z51.81]                 Follow-up Instructio